# Patient Record
Sex: MALE | Race: BLACK OR AFRICAN AMERICAN | NOT HISPANIC OR LATINO | Employment: UNEMPLOYED | URBAN - METROPOLITAN AREA
[De-identification: names, ages, dates, MRNs, and addresses within clinical notes are randomized per-mention and may not be internally consistent; named-entity substitution may affect disease eponyms.]

---

## 2021-08-18 ENCOUNTER — OFFICE VISIT (OUTPATIENT)
Dept: INTERNAL MEDICINE | Facility: CLINIC | Age: 46
End: 2021-08-18

## 2021-08-18 VITALS
TEMPERATURE: 97.1 F | SYSTOLIC BLOOD PRESSURE: 144 MMHG | HEART RATE: 89 BPM | BODY MASS INDEX: 31.99 KG/M2 | HEIGHT: 69 IN | WEIGHT: 216 LBS | DIASTOLIC BLOOD PRESSURE: 90 MMHG | OXYGEN SATURATION: 99 %

## 2021-08-18 DIAGNOSIS — Z72.51 HIGH RISK HETEROSEXUAL BEHAVIOR: ICD-10-CM

## 2021-08-18 DIAGNOSIS — R30.0 DYSURIA: ICD-10-CM

## 2021-08-18 DIAGNOSIS — R03.0 SINGLE EPISODE OF ELEVATED BLOOD PRESSURE: ICD-10-CM

## 2021-08-18 DIAGNOSIS — J45.990 EXERCISE-INDUCED ASTHMA: ICD-10-CM

## 2021-08-18 DIAGNOSIS — Z13.220 SCREENING CHOLESTEROL LEVEL: Primary | ICD-10-CM

## 2021-08-18 DIAGNOSIS — Z13.1 SCREENING FOR DIABETES MELLITUS: ICD-10-CM

## 2021-08-18 DIAGNOSIS — Z00.00 ANNUAL PHYSICAL EXAM: ICD-10-CM

## 2021-08-18 DIAGNOSIS — E66.9 OBESITY (BMI 30-39.9): ICD-10-CM

## 2021-08-18 DIAGNOSIS — F17.200 NICOTINE USE DISORDER: ICD-10-CM

## 2021-08-18 DIAGNOSIS — Z12.11 SCREENING FOR COLON CANCER: ICD-10-CM

## 2021-08-18 DIAGNOSIS — F10.20 ALCOHOL USE DISORDER, SEVERE, DEPENDENCE (HCC): ICD-10-CM

## 2021-08-18 PROCEDURE — 99386 PREV VISIT NEW AGE 40-64: CPT | Performed by: STUDENT IN AN ORGANIZED HEALTH CARE EDUCATION/TRAINING PROGRAM

## 2021-08-18 PROCEDURE — 99204 OFFICE O/P NEW MOD 45 MIN: CPT | Performed by: STUDENT IN AN ORGANIZED HEALTH CARE EDUCATION/TRAINING PROGRAM

## 2021-08-18 RX ORDER — ALBUTEROL SULFATE 90 UG/1
2 AEROSOL, METERED RESPIRATORY (INHALATION) EVERY 4 HOURS PRN
Qty: 8 G | Refills: 5 | Status: SHIPPED | OUTPATIENT
Start: 2021-08-18

## 2021-08-18 RX ORDER — ASPIRIN 81 MG/1
81 TABLET ORAL DAILY
Qty: 90 TABLET | Refills: 1 | Status: SHIPPED | OUTPATIENT
Start: 2021-08-18

## 2021-08-18 RX ORDER — NICOTINE 21-14-7MG
KIT TRANSDERMAL
Qty: 1 EACH | Refills: 0 | Status: SHIPPED | OUTPATIENT
Start: 2021-08-18 | End: 2021-09-22

## 2021-08-18 NOTE — PROGRESS NOTES
"Chief Complaint  Annual checkup    HISTORY    Darrel Adams is a 46 y.o. male who presents to the office today as a  a new patient for their annual preventative exam. Their last preventative exam was at least 10 year or so ago. He is a transplant here from the [place] area and the exam was at a facility in that area.      Recently treated at the following:  Other: [facility]  right tib/fib fracture after changing a light bulb when the chair broke from underneath him. They set it in the ER there and they placed a cast. He saw an additional orthopedic specialist there and they informed him that a surgical procedure can be done if needed to assist him in his walking. They told him to stop smoking before they could do the surgery. They suggested he go to physical therapy but he has not gone to that yet, he plans to at some point soon.     Patient's overall comments about their health or any specific health concerns they report: he feels \"great\" but if he were to compare his current lifestyle of alcohol and tobacco to his previous life of being more active, playing in sports he would say that his health has declined.      No Known Allergies     No outpatient medications have been marked as taking for the 8/18/21 encounter (Office Visit) with Eleno Santizo DO.        Past Medical History:   Diagnosis Date   • Exercise-induced asthma    • Tibia/fibula fracture 2021    right side March 2021   History reviewed. No pertinent surgical history.  Family History   Problem Relation Age of Onset   • Alcohol abuse Mother    • Liver disease Mother    • No Known Problems Father         patient states he is not certain    • No Known Problems Sister         patient states he is not certain    • No Known Problems Brother         patient states he is not certain    • Cancer Neg Hx     reports that he has been smoking cigarettes. He has a 14.00 pack-year smoking history. He has quit using smokeless tobacco. He reports current alcohol use. He reports " "that he does not use drugs.      There is no immunization history on file for this patient.     OBJECTIVE    Vital Signs:   /90   Pulse 89   Temp 97.1 °F (36.2 °C) (Temporal)   Ht 175.3 cm (69\")   Wt 98 kg (216 lb)   SpO2 99%   BMI 31.90 kg/m²     Physical Exam  Vitals reviewed.   Constitutional:       General: He is not in acute distress.     Appearance: He is obese.   HENT:      Head: Normocephalic and atraumatic.      Right Ear: Tympanic membrane, ear canal and external ear normal. There is no impacted cerumen.      Left Ear: Tympanic membrane, ear canal and external ear normal. There is no impacted cerumen.      Mouth/Throat:      Mouth: Mucous membranes are moist.      Pharynx: No oropharyngeal exudate or posterior oropharyngeal erythema.   Eyes:      General: No scleral icterus.     Extraocular Movements: Extraocular movements intact.      Conjunctiva/sclera: Conjunctivae normal.      Pupils: Pupils are equal, round, and reactive to light.      Comments: Scleral melanocytosis present   Cardiovascular:      Rate and Rhythm: Normal rate and regular rhythm.      Heart sounds: Normal heart sounds. No murmur heard.     Pulmonary:      Effort: Pulmonary effort is normal. No respiratory distress.      Breath sounds: Normal breath sounds. No wheezing.   Abdominal:      General: Bowel sounds are normal. There is no distension.      Palpations: Abdomen is soft.      Tenderness: There is abdominal tenderness (Tenderness left upper quadrant without guarding).   Musculoskeletal:      Cervical back: Neck supple.      Right lower leg: No edema.      Left lower leg: No edema.   Lymphadenopathy:      Cervical: No cervical adenopathy.   Skin:     General: Skin is warm and dry.      Coloration: Skin is not jaundiced.   Neurological:      General: No focal deficit present.      Mental Status: He is alert and oriented to person, place, and time.      Cranial Nerves: No cranial nerve deficit.      Motor: No weakness.    "   Gait: Gait abnormal (Limping on right leg).   Psychiatric:         Mood and Affect: Mood normal.         Behavior: Behavior normal.         Thought Content: Thought content normal.            ASSESSMENT & PLAN     #Annual Preventative Health Examination  -Patient presents today for preventative health exam. The patient's last preventative health exam was over 10 years ago. Discussed with patient the importance of an annual preventative exam and encouraged continued yearly annual exams.   -Age and sex appropriate physical exam performed and documented. Updated past medical, family, social and surgical histories as well as allergies and care team list. Addressed care gaps listed in the medical record.  -Encouraged seat belt use for every car ride for patient and all occupants. Discussed securing of all guns in the home for patient and family protection. Encouraged sunscreen use to reduce risk of skin cancer for any days with sun exposure over 20 minutes. Recommended helmet if biking or riding motorcycle to prevent head trauma. Discussed the importance of smoke and carbon monoxide detectors in the home.   -Encouraged annual dental and vision exams as part of their overall health.  -Encouraged minimum of 30 minutes or more of exercise at a brisk walk or higher 5 days per week combined with a well-balanced diet. Exercise and dietary instructions will be provided in after visit summary.  -Immunizations reviewed and updated in EMR.  -Lipid screening:   Will screen today with lipid panel.   -Aspirin for primary or secondary prevention: Advised patient that aspirin 81 mg for prevention of coronary artery disease in healthy adults is controversial due to risk of GI bleeding, though some studies have shown that it could lead to decrease in colon cancer and heart attack. The risk for bleeding goes up as you get older and the risks likely outweigh the benefits in patients with a history of GI bleeding or peptic ulcer disease  or bleeding at other sites, age > 70 years, thrombocytopenia, coagulopathy, chronic kidney disease, or concurrent use of other medications that increase bleeding risks such as NSAIDs, steroids, anticoagulants or warfarin. Patient accepted aspirin therapy.  -Depression screening: PHQ2 performed and the patient's screen was negative. States that he has some feeling of being down from eviction but no depression.    -Diabetes screening:  Patient is 40-70 years of age and overweight, screening for diabetes is indicated every 3 years.  Will obtain hemoglobin A1c today.  -Tobacco use screening: Patient reports cigarette use. Tobacco counseling was declined. Advised patient that vaping is discouraged and data is beginning to support that it is associated with adverse health outcomes as well. If accepted, smoking cessation counseling will be documented below. He states that he is willing to try nicotine gum and patch, will prescribe.    -Alcohol use screening: Patient reports drinking a pint of alcohol daily.  I informed him that this could have consequences such as liver damage.  I told him I would not be surprised if his liver enzymes are elevated today.  Advised him to decrease drinking as much as possible.  He denied counseling.  Discussed that he qualifies for pneumonia vaccine given his alcohol use and he declined.  -Illicit drug screening: Patient does not use illicit drugs.   -Abdominal aortic aneurysm screening: AAA screening is not indicated as patient is less than 65 years of age.  -Hypertension screening: patient BP slightly elevated at 144/90, no known history. Have asked patient to keep a log at home with automated cuff and call office if noticing BP at home 130/80 or more.   -HIV screening: Discussed with patient the importance of identifying asymptomatic HIV infection for adults in their age group with a one time screening test .Patient accepted screening.   -Syphilis screening: Patient is at high risk for  sexually transmitted infections due to another risk factor and screening is indicated yearly. Patient accepted screening.  -Hepatitis B virus screening: Patient is in a high-risk group, screening indicated. Patient accepted screening.  -Hepatitis C virus screening:  Discussed with patient that the [employer]PSTF recommends a one-time screening of hepatitis C in all adults 18-79 years old. Patient accepted screening.  -Colon cancer screening: Patient is age 45-75 and I discussed the importance of colon cancer screening in order to detect cancerous or pre-cancerous lesions early in an effort to limit severity of cancer and possibly death if it is present. I informed the patient that screening for colon cancer has risks but these risks are small and include risks associated with a colonoscopy such as bleeding, pain or perforation, or the potential to need additional follow up tests or surgeries depending on the results. Discussed the options of colon cancer screening (i.e., colonoscopy vs FIT testing) and the benefits and disadvantages of each. Patient accepted screening. Will order cologuard per patient preference.   -Lung cancer screening: Patient is less than age 55, screening not indicated.  -Prostate cancer screening: Not applicable, patient is less than 55 years old.        Follow Up  Follow up in 1 year for annual physical exam.    Patient/family had no further questions at this time and verbalized understanding of the plan discussed today

## 2021-08-18 NOTE — PROGRESS NOTES
Chief Complaint  Painful urination alcohol use, smoking      SUBJECTIVE    HPI:  Darrel Adams is a 46 y.o. male who presents to the office today as a new patient to establish care.     Dysuria:  He has had some burning with going to the restroom, that has been going on for the last month to month and a half. It starts once his urine stream has started and it is at the tip of the penis. He notices it sometimes after he has been drinking heavily. No urethral discharge, no foul smelling urine.  No perineal pain or pelvic pain.  He was last sexually active around 6 months ago and that was with at least 3 different partners. He states that they have never shared that they had STIs. At one point he felt that his urine smelled sweet but no other odors. His urine is still yellow, no pink or red urine.  No fevers or chills.    Asthma: Patient states that he had exercise-induced asthma since childhood.  He does not currently have an albuterol inhaler.  He states that he feels wheezing about once per month.  Denies chest pain or shortness of air with activity or at rest.      Review of Systems   Constitutional: Negative for fever.   Cardiovascular: Positive for leg swelling (right leg after fracture). Negative for chest pain.   Genitourinary: Positive for dysuria.       No Known Allergies     No outpatient medications have been marked as taking for the 8/18/21 encounter (Office Visit) with Eleno Santizo DO.        Past Medical History:   Diagnosis Date   • Exercise-induced asthma    • Tibia/fibula fracture 2021    right side March 2021   History reviewed. No pertinent surgical history.  Family History   Problem Relation Age of Onset   • Alcohol abuse Mother    • Liver disease Mother    • No Known Problems Father         patient states he is not certain    • No Known Problems Sister         patient states he is not certain    • No Known Problems Brother         patient states he is not certain    • Cancer Neg Hx     reports that  "he has been smoking cigarettes. He has a 14.00 pack-year smoking history. He has quit using smokeless tobacco. He reports current alcohol use. He reports that he does not use drugs.    OBJECTIVE    Vital Signs:   /90   Pulse 89   Temp 97.1 °F (36.2 °C) (Temporal)   Ht 175.3 cm (69\")   Wt 98 kg (216 lb)   SpO2 99%   BMI 31.90 kg/m²     Physical Exam   Vitals reviewed.   Constitutional:       General: He is not in acute distress.     Appearance: He is obese.   HENT:      Head: Normocephalic and atraumatic.      Right Ear: Tympanic membrane, ear canal and external ear normal. There is no impacted cerumen.      Left Ear: Tympanic membrane, ear canal and external ear normal. There is no impacted cerumen.      Mouth/Throat:      Mouth: Mucous membranes are moist.      Pharynx: No oropharyngeal exudate or posterior oropharyngeal erythema.   Eyes:      General: No scleral icterus.     Extraocular Movements: Extraocular movements intact.      Conjunctiva/sclera: Conjunctivae normal.      Pupils: Pupils are equal, round, and reactive to light.      Comments: Scleral melanocytosis present   Cardiovascular:      Rate and Rhythm: Normal rate and regular rhythm.      Heart sounds: Normal heart sounds. No murmur heard.     Pulmonary:      Effort: Pulmonary effort is normal. No respiratory distress.      Breath sounds: Normal breath sounds. No wheezing.   Abdominal:      General: Bowel sounds are normal. There is no distension.      Palpations: Abdomen is soft.      Tenderness: There is abdominal tenderness (Tenderness left upper quadrant without guarding).   Musculoskeletal:      Cervical back: Neck supple.      Right lower leg: No edema.      Left lower leg: No edema.   Lymphadenopathy:      Cervical: No cervical adenopathy.   Skin:     General: Skin is warm and dry.      Coloration: Skin is not jaundiced.   Neurological:      General: No focal deficit present.      Mental Status: He is alert and oriented to person, " place, and time.      Cranial Nerves: No cranial nerve deficit.      Motor: No weakness.      Gait: Gait abnormal (Limping on right leg).   Psychiatric:         Mood and Affect: Mood normal.         Behavior: Behavior normal.         Thought Content: Thought content normal.                        ASSESSMENT & PLAN     Diagnoses and all orders for this visit:    1. Screening cholesterol level (Primary)  -     Lipid Panel With LDL/HDL Ratio    2. Dysuria  -No pelvic pain, no purulent discharge.  No significant changes in his urine odor or color.  6 months ago he did have 3 sexual encounters.  Will obtain urinalysis today as well as chlamydia, gonorrhea, trichomonas urine testing.  -     Urinalysis With Culture If Indicated - Urine, Clean Catch  -     Chlamydia trachomatis, Neisseria gonorrhoeae, Trichomonas vaginalis, PCR - Urine, Urine, Clean Catch    3. Alcohol use disorder, severe, dependence (CMS/HCC)  -Patient refused counseling today.  He is drinking a pint of liquor per day.  I informed him that this is a high risk for liver damage.  We will screen for that today with CMP.  Will screen for anemia with CBC.  Unemployment, Covid, issues with housing is a serious concern for him.  We will continue to provide support and recommend cessation.  -     Comprehensive metabolic panel  -     CBC w AUTO Differential    4. Single episode of elevated blood pressure   -BP elevated at 144/90 today.  Patient does have a blood pressure cuff at home.  He has no known history of hypertension.  I have asked patient to keep a blood pressure log, check blood pressure randomly several times per week and bring the log to his next appointment.  Call office earlier if he is noticing blood pressure readings at home of 130/80.    5. High risk heterosexual behavior  -Last high risk sexual behavior risk was approximately 6 months ago with 3 partners.  Has had some dysuria so will screen.  Advised on safe sex practices  -     Hepatitis panel,  acute  -     RPR  -     HIV-1/O/2 Ag/Ab w Reflex  -     Chlamydia trachomatis, Neisseria gonorrhoeae, Trichomonas vaginalis, PCR - Urine, Urine, Clean Catch    6. Obesity (BMI 30-39.9)  -Encouraged 30 minutes/day of exercise 5 days a week at a brisk walk or more.  Will obtain thyroid screening.  -     TSH Rfx On Abnormal To Free T4    7. Nicotine use disorder  -Patient refused formal smoking cessation counseling today.  He was willing to accept nicotine patches and gum.  Provided education on how to use those.  -     Nicotine 21-14-7 MG/24HR kit; Use one 21 mg patch per day Weeks 1-4, then use one 14 mg patch per day Weeks 5-6, then Use one 7 mg patch/day Weeks 7-8.  Dispense: 1 each; Refill: 0  -     nicotine polacrilex (NICORETTE) 2 MG gum; Chew 1 each As Needed for Smoking Cessation. Use every 3 hours for cravings. Use one piece of gum at a time.  Dispense: 220 each; Refill: 1    8. Exercise-induced asthma  -Symptoms seem rare at once per month wheezing.  Will obtain formal PFTs to assess severity.  Will provide albuterol inhaler as needed.  -     albuterol sulfate  (90 Base) MCG/ACT inhaler; Inhale 2 puffs Every 4 (Four) Hours As Needed for Wheezing.  Dispense: 8 g; Refill: 5  -     Full Pulmonary Function Test With Bronchodilator; Future    9. Screening for diabetes mellitus  -     Hemoglobin A1c    10. Screening for colon cancer  -     Cologuard - Stool, Per Rectum; Future      Other orders  -     aspirin (aspirin) 81 MG EC tablet; Take 1 tablet by mouth Daily.  Dispense: 90 tablet; Refill: 1            The following social determinates of health impact the patient's medical decision making: Z56 - Problems related to employment and unemployment and Z59 - Problems related to housing and economic circumstances    Follow Up  Return in about 4 weeks (around 9/15/2021).    Patient/family had no further questions at this time and verbalized understanding of the plan discussed today. I asked the patient to  contact the office via MyChart or telephone if they have any concerns and that I expect to respond to routine calls and YChartshart messages within 1-2 business days and send them follow up on routine lab tests within one week.     Informed patient to visit their nearest emergency department if they experience symptoms such as chest pain, new onset of shortness of air or shortness of air that is worse than their baseline, severe abdominal pain or blood in the stool, inability to eat or drink, thoughts of hurting themselves or others, or any other concerns they feel need emergency medical attention.

## 2021-08-20 LAB
ALBUMIN SERPL-MCNC: 4.8 G/DL (ref 3.5–5.2)
ALBUMIN/GLOB SERPL: 1.7 G/DL
ALP SERPL-CCNC: 106 U/L (ref 39–117)
ALT SERPL-CCNC: 40 U/L (ref 1–41)
APPEARANCE UR: CLEAR
AST SERPL-CCNC: 22 U/L (ref 1–40)
BACTERIA #/AREA URNS HPF: NORMAL /HPF
BASOPHILS # BLD AUTO: 0.03 10*3/MM3 (ref 0–0.2)
BASOPHILS NFR BLD AUTO: 0.3 % (ref 0–1.5)
BILIRUB SERPL-MCNC: 0.3 MG/DL (ref 0–1.2)
BILIRUB UR QL STRIP: NEGATIVE
BUN SERPL-MCNC: 16 MG/DL (ref 6–20)
BUN/CREAT SERPL: 13.4 (ref 7–25)
C TRACH RRNA SPEC QL NAA+PROBE: NEGATIVE
CALCIUM SERPL-MCNC: 10 MG/DL (ref 8.6–10.5)
CASTS URNS QL MICRO: NORMAL /LPF
CHLORIDE SERPL-SCNC: 102 MMOL/L (ref 98–107)
CHOLEST SERPL-MCNC: 273 MG/DL (ref 0–200)
CO2 SERPL-SCNC: 26.1 MMOL/L (ref 22–29)
COLOR UR: YELLOW
CREAT SERPL-MCNC: 1.19 MG/DL (ref 0.76–1.27)
EOSINOPHIL # BLD AUTO: 0.1 10*3/MM3 (ref 0–0.4)
EOSINOPHIL NFR BLD AUTO: 1.1 % (ref 0.3–6.2)
EPI CELLS #/AREA URNS HPF: NORMAL /HPF (ref 0–10)
ERYTHROCYTE [DISTWIDTH] IN BLOOD BY AUTOMATED COUNT: 14.3 % (ref 12.3–15.4)
GLOBULIN SER CALC-MCNC: 2.8 GM/DL
GLUCOSE SERPL-MCNC: 119 MG/DL (ref 65–99)
GLUCOSE UR QL: NEGATIVE
HAV IGM SERPL QL IA: NEGATIVE
HBA1C MFR BLD: 6 % (ref 4.8–5.6)
HBV CORE IGM SERPL QL IA: NEGATIVE
HBV SURFACE AG SERPL QL IA: NEGATIVE
HCT VFR BLD AUTO: 48.9 % (ref 37.5–51)
HCV AB S/CO SERPL IA: 0.2 S/CO RATIO (ref 0–0.9)
HDLC SERPL-MCNC: 48 MG/DL (ref 40–60)
HGB BLD-MCNC: 16.7 G/DL (ref 13–17.7)
HGB UR QL STRIP: NEGATIVE
HIV 1+2 AB+HIV1 P24 AG SERPL QL IA: NON REACTIVE
IMM GRANULOCYTES # BLD AUTO: 0.08 10*3/MM3 (ref 0–0.05)
IMM GRANULOCYTES NFR BLD AUTO: 0.9 % (ref 0–0.5)
KETONES UR QL STRIP: NEGATIVE
LDLC SERPL CALC-MCNC: 155 MG/DL (ref 0–100)
LDLC/HDLC SERPL: 3.13 {RATIO}
LEUKOCYTE ESTERASE UR QL STRIP: NEGATIVE
LYMPHOCYTES # BLD AUTO: 2.73 10*3/MM3 (ref 0.7–3.1)
LYMPHOCYTES NFR BLD AUTO: 29.2 % (ref 19.6–45.3)
MCH RBC QN AUTO: 28.5 PG (ref 26.6–33)
MCHC RBC AUTO-ENTMCNC: 34.2 G/DL (ref 31.5–35.7)
MCV RBC AUTO: 83.6 FL (ref 79–97)
MICRO URNS: NORMAL
MICRO URNS: NORMAL
MONOCYTES # BLD AUTO: 0.58 10*3/MM3 (ref 0.1–0.9)
MONOCYTES NFR BLD AUTO: 6.2 % (ref 5–12)
N GONORRHOEA RRNA SPEC QL NAA+PROBE: NEGATIVE
NEUTROPHILS # BLD AUTO: 5.82 10*3/MM3 (ref 1.7–7)
NEUTROPHILS NFR BLD AUTO: 62.3 % (ref 42.7–76)
NITRITE UR QL STRIP: NEGATIVE
NRBC BLD AUTO-RTO: 0 /100 WBC (ref 0–0.2)
PH UR STRIP: 5 [PH] (ref 5–7.5)
PLATELET # BLD AUTO: 199 10*3/MM3 (ref 140–450)
POTASSIUM SERPL-SCNC: 4.2 MMOL/L (ref 3.5–5.2)
PROT SERPL-MCNC: 7.6 G/DL (ref 6–8.5)
PROT UR QL STRIP: NORMAL
RBC # BLD AUTO: 5.85 10*6/MM3 (ref 4.14–5.8)
RBC #/AREA URNS HPF: NORMAL /HPF (ref 0–2)
RPR SER QL: NORMAL
SODIUM SERPL-SCNC: 139 MMOL/L (ref 136–145)
SP GR UR: 1.03 (ref 1–1.03)
T VAGINALIS DNA SPEC QL NAA+PROBE: NEGATIVE
TRIGL SERPL-MCNC: 374 MG/DL (ref 0–150)
TSH SERPL DL<=0.005 MIU/L-ACNC: 1.18 UIU/ML (ref 0.27–4.2)
URINALYSIS REFLEX: NORMAL
UROBILINOGEN UR STRIP-MCNC: 0.2 MG/DL (ref 0.2–1)
VLDLC SERPL CALC-MCNC: 70 MG/DL (ref 5–40)
WBC # BLD AUTO: 9.34 10*3/MM3 (ref 3.4–10.8)
WBC #/AREA URNS HPF: NORMAL /HPF (ref 0–5)

## 2021-08-20 NOTE — PROGRESS NOTES
Please call patient and let him know that I have reviewed the results of his laboratory test.  His cholesterol and triglycerides are elevated and his blood work shows that he has prediabetes and I recommend that he begins exercising at least 30 minutes/day at a brisk walk or more for 5 days a week and cut back on fatty-processed-or fast foods.  His kidneys, electrolytes, liver function are within normal limits.  His hepatitis screening is negative as is his HIV, chlamydia, gonorrhea, and syphilis screening.  His thyroid function is within normal limits.  It is important that he keeps his next appointment so that we can discuss the cholesterol and prediabetes.

## 2021-08-23 ENCOUNTER — TELEPHONE (OUTPATIENT)
Dept: INTERNAL MEDICINE | Facility: CLINIC | Age: 46
End: 2021-08-23

## 2021-08-23 NOTE — TELEPHONE ENCOUNTER
PATIENT IS CALLING IN STATING THAT HE DOES NOT HAVE THE PRESCRIPTION FOR THE AT HOME COLON CANCER TEST AND HE NEEDS THIS IN ORDER TO PICK THE TEST UP FROM HIS PHARMACY.  PATIENT CALL BACK

## 2021-08-23 NOTE — TELEPHONE ENCOUNTER
Patient is aware kit will be mailed to him for the colon cancer screening.. Patient verbalized understanding.

## 2021-08-25 ENCOUNTER — TRANSCRIBE ORDERS (OUTPATIENT)
Dept: ADMINISTRATIVE | Facility: HOSPITAL | Age: 46
End: 2021-08-25

## 2021-08-25 DIAGNOSIS — Z01.818 OTHER SPECIFIED PRE-OPERATIVE EXAMINATION: Primary | ICD-10-CM

## 2021-09-08 ENCOUNTER — TELEPHONE (OUTPATIENT)
Dept: INTERNAL MEDICINE | Facility: CLINIC | Age: 46
End: 2021-09-08

## 2021-09-08 NOTE — TELEPHONE ENCOUNTER
PATIENT CALLED STATING THAT THE COLON TEST DID NOT ARRIVE YET AND HE IS WANTING TO GET THIS DONE BEFORE HIS NEXT APPT.     CAN WE GET AN UPDATE ON THIS OR RESEND THE TEST?         HUB ALSO ADVISED TO HAVE PCP LISTED ON CARD FOR HIS VISIT     Darrel Adams (Self) [phone] (M)

## 2021-09-08 NOTE — TELEPHONE ENCOUNTER
Cologard kit is going out today. Patient should received it in about 7-10 days. Patient has been notified.

## 2021-09-22 ENCOUNTER — OFFICE VISIT (OUTPATIENT)
Dept: INTERNAL MEDICINE | Facility: CLINIC | Age: 46
End: 2021-09-22

## 2021-09-22 VITALS
DIASTOLIC BLOOD PRESSURE: 98 MMHG | TEMPERATURE: 96.9 F | WEIGHT: 218 LBS | SYSTOLIC BLOOD PRESSURE: 180 MMHG | OXYGEN SATURATION: 98 % | HEIGHT: 69 IN | HEART RATE: 95 BPM | BODY MASS INDEX: 32.29 KG/M2

## 2021-09-22 DIAGNOSIS — I10 ESSENTIAL HYPERTENSION: ICD-10-CM

## 2021-09-22 DIAGNOSIS — F17.200 NICOTINE USE DISORDER: ICD-10-CM

## 2021-09-22 DIAGNOSIS — Z23 ENCOUNTER FOR IMMUNIZATION: ICD-10-CM

## 2021-09-22 DIAGNOSIS — E78.2 MIXED HYPERLIPIDEMIA: ICD-10-CM

## 2021-09-22 DIAGNOSIS — F10.29 ALCOHOL DEPENDENCE WITH UNSPECIFIED ALCOHOL-INDUCED DISORDER (HCC): ICD-10-CM

## 2021-09-22 DIAGNOSIS — R73.03 PREDIABETES: Primary | ICD-10-CM

## 2021-09-22 PROCEDURE — 90732 PPSV23 VACC 2 YRS+ SUBQ/IM: CPT | Performed by: STUDENT IN AN ORGANIZED HEALTH CARE EDUCATION/TRAINING PROGRAM

## 2021-09-22 PROCEDURE — 99417 PROLNG OP E/M EACH 15 MIN: CPT | Performed by: STUDENT IN AN ORGANIZED HEALTH CARE EDUCATION/TRAINING PROGRAM

## 2021-09-22 PROCEDURE — 90471 IMMUNIZATION ADMIN: CPT | Performed by: STUDENT IN AN ORGANIZED HEALTH CARE EDUCATION/TRAINING PROGRAM

## 2021-09-22 PROCEDURE — 99215 OFFICE O/P EST HI 40 MIN: CPT | Performed by: STUDENT IN AN ORGANIZED HEALTH CARE EDUCATION/TRAINING PROGRAM

## 2021-09-22 RX ORDER — BUPROPION HYDROCHLORIDE 150 MG/1
TABLET, EXTENDED RELEASE ORAL
Qty: 57 TABLET | Refills: 0 | Status: SHIPPED | OUTPATIENT
Start: 2021-09-22 | End: 2021-09-22

## 2021-09-22 RX ORDER — ATORVASTATIN CALCIUM 20 MG/1
20 TABLET, FILM COATED ORAL DAILY
Qty: 90 TABLET | Refills: 1 | Status: SHIPPED | OUTPATIENT
Start: 2021-09-22

## 2021-09-22 RX ORDER — VARENICLINE TARTRATE 1 MG/1
1 TABLET, FILM COATED ORAL 2 TIMES DAILY
Qty: 60 TABLET | Refills: 1 | Status: SHIPPED | OUTPATIENT
Start: 2021-10-22

## 2021-09-22 RX ORDER — VARENICLINE TARTRATE 1 MG/1
1 TABLET, FILM COATED ORAL 2 TIMES DAILY
Qty: 60 TABLET | Refills: 1 | Status: SHIPPED | OUTPATIENT
Start: 2021-10-17 | End: 2021-09-22

## 2021-09-22 RX ORDER — VARENICLINE TARTRATE 0.5 MG/1
TABLET, FILM COATED ORAL
Qty: 53 TABLET | Refills: 0 | Status: SHIPPED | OUTPATIENT
Start: 2021-09-22

## 2021-09-22 RX ORDER — AMLODIPINE BESYLATE 5 MG/1
5 TABLET ORAL DAILY
Qty: 90 TABLET | Refills: 0 | Status: SHIPPED | OUTPATIENT
Start: 2021-09-22

## 2021-09-22 NOTE — PROGRESS NOTES
"  Eleno Santizo D.O.  Internal Medicine  [facility]  [address]  [place] [address]  [phone]      Chief Complaint  4 weeks follow-up on cholesterol (patient would like to get the pneumonia vaccine today)    SUBJECTIVE    History of Present Illness    Darrel Adams is a 46 y.o. male who presents to the office today as {new/est:75395}       Review of Systems    No Known Allergies     Outpatient Medications Marked as Taking for the 9/22/21 encounter (Office Visit) with Eleno Santizo, DO   Medication Sig Dispense Refill   • albuterol sulfate  (90 Base) MCG/ACT inhaler Inhale 2 puffs Every 4 (Four) Hours As Needed for Wheezing. 8 g 5   • aspirin (aspirin) 81 MG EC tablet Take 1 tablet by mouth Daily. 90 tablet 1   • [DISCONTINUED] Nicotine 21-14-7 MG/24HR kit Use one 21 mg patch per day Weeks 1-4, then use one 14 mg patch per day Weeks 5-6, then Use one 7 mg patch/day Weeks 7-8. 1 each 0   • [DISCONTINUED] nicotine polacrilex (NICORETTE) 2 MG gum Chew 1 each As Needed for Smoking Cessation. Use every 3 hours for cravings. Use one piece of gum at a time. 220 each 1        {new/est histories:22304}    OBJECTIVE    Vital Signs:   /98   Pulse 95   Temp 96.9 °F (36.1 °C)   Ht 175.3 cm (69\")   Wt 98.9 kg (218 lb)   SpO2 98%   BMI 32.19 kg/m²     Physical Exam       {The following data was reviewed by (Optional):52353}  {Ambulatory Labs (Optional):80950}  {Data reviewed (Optional):48990:::1}             ASSESSMENT & PLAN     Diagnoses and all orders for this visit:    1. Prediabetes (Primary)    2. Nicotine use disorder  -     buPROPion SR (WELLBUTRIN SR) 150 MG 12 hr tablet; Take 1 tablet by mouth Daily for 3 days, THEN 1 tablet 2 (Two) Times a Day for 27 days.  Dispense: 57 tablet; Refill: 0    3. Essential hypertension  -     amLODIPine (NORVASC) 5 MG tablet; Take 1 tablet by mouth Daily.  Dispense: 90 tablet; Refill: 0    4. Mixed hyperlipidemia  -     atorvastatin " "(LIPITOR) 20 MG tablet; Take 1 tablet by mouth Daily.  Dispense: 90 tablet; Refill: 1        {Time Spent (Optional):06735}    The following social determinates of health impact the patient's medical decision making: {[facility]:08683::\"No social determinates of health were factored in to today's visit. \"}    Follow Up  Return in about 4 weeks (around 10/20/2021) for Recheck.    Patient/family had no further questions at this time and verbalized understanding of the plan discussed today.   "

## 2021-09-22 NOTE — PROGRESS NOTES
"  Eleno Santizo D.O.  Internal Medicine  [facility]  [address]  [place] [address]  [phone]      Chief Complaint  4 weeks follow-up on cholesterol (patient would like to get the pneumonia vaccine today)    SUBJECTIVE    History of Present Illness    Darrel Adams is a 46 y.o. male who presents to the office today as an established patient that last saw me on 8/18/2021.     Still drinking, pt states up to a pint per day. He does not have a strong desire to quit drinking. He states his primary motivation to keep drinking is his employment situation and housing situation.     Still smoking as well, states he is smoking 1 pack per day. He tried the nicotine patches that I provided him last visit and he states that he smoked right through them.     BP: Not checking BP at home, had an elevated reading of 144/90 at his last visit.         No Known Allergies     Outpatient Medications Marked as Taking for the 9/22/21 encounter (Office Visit) with Eleno Santizo, DO   Medication Sig Dispense Refill   • albuterol sulfate  (90 Base) MCG/ACT inhaler Inhale 2 puffs Every 4 (Four) Hours As Needed for Wheezing. 8 g 5   • aspirin (aspirin) 81 MG EC tablet Take 1 tablet by mouth Daily. 90 tablet 1   • [DISCONTINUED] Nicotine 21-14-7 MG/24HR kit Use one 21 mg patch per day Weeks 1-4, then use one 14 mg patch per day Weeks 5-6, then Use one 7 mg patch/day Weeks 7-8. 1 each 0   • [DISCONTINUED] nicotine polacrilex (NICORETTE) 2 MG gum Chew 1 each As Needed for Smoking Cessation. Use every 3 hours for cravings. Use one piece of gum at a time. 220 each 1        Past Medical History:   Diagnosis Date   • Exercise-induced asthma    • Tibia/fibula fracture 2021    right side March 2021       OBJECTIVE    Vital Signs:   /98   Pulse 95   Temp 96.9 °F (36.1 °C)   Ht 175.3 cm (69\")   Wt 98.9 kg (218 lb)   SpO2 98%   BMI 32.19 kg/m²     Physical Exam  Vitals reviewed.   Constitutional:       " General: He is not in acute distress.     Appearance: Normal appearance.   Eyes:      General: No scleral icterus.  Cardiovascular:      Rate and Rhythm: Normal rate and regular rhythm.      Heart sounds: Normal heart sounds. No murmur heard.     Pulmonary:      Effort: Pulmonary effort is normal. No respiratory distress.      Breath sounds: Normal breath sounds. No wheezing.   Musculoskeletal:      Right lower leg: No edema.      Left lower leg: No edema.   Neurological:      Mental Status: He is alert and oriented to person, place, and time.   Psychiatric:         Mood and Affect: Mood normal.         Behavior: Behavior normal.            The following data was reviewed by: Eleno Santizo DO on 09/22/2021:  Common labs    Common Labsle 8/18/21 8/18/21 8/18/21 8/18/21    1050 1050 1050 1050   Glucose   119 (A)    BUN   16    Creatinine   1.19    eGFR Non  Am   66    eGFR African Am   80    Sodium   139    Potassium   4.2    Chloride   102    Calcium   10.0    Total Protein   7.6    Albumin   4.80    Total Bilirubin   0.3    Alkaline Phosphatase   106    AST (SGOT)   22    ALT (SGPT)   40    WBC    9.34   Hemoglobin    16.7   Hematocrit    48.9   Platelets    199   Total Cholesterol 273 (A)      Triglycerides 374 (A)      HDL Cholesterol 48      LDL Cholesterol  155 (A)      Hemoglobin A1C  6.00 (A)     (A) Abnormal value       Comments are available for some flowsheets but are not being displayed.                        ASSESSMENT & PLAN     Diagnoses and all orders for this visit:    1. Prediabetes (Primary)  -A1c was 6 in August 2021, BMI 32. Encouraged aggressive weight loss as well as increase in exercise and decrease in sugary foods. Consider referral to nutrition if he is not able to have success on his own, can also consider metformin at that time given his high risk.     2. Nicotine use disorder  -Currently smoking 1 pack per day, failed nicotine replacement therapy prescribed last month. He does  express a desire to quit smoking still, so I will Rx a trial of varenicline. He does not have a history of seizures but he does have ETOH use and bupropion is contraindicated. Encouraged him to set a quit date and discard all smoking related items from his home and car. Will assess how he is doing at next visit. Discussed seriousness of smoking while having prediabetes and elevated BP and cholesterol.     3. Essential hypertension  -BP elevated in office at 180/98 and on repeat 134/60, he is not checking it at home, he is financially limited. Last visit his BP was 144/90. Discussed that alcohol use can impact his BP as well. Will start with amlodipine 5 mg daily , assess for improvement at next visit.   -     amLODIPine (NORVASC) 5 MG tablet; Take 1 tablet by mouth Daily.  Dispense: 90 tablet; Refill: 0    4. Mixed hyperlipidemia  -Tchol 273, Trig 374,  and HDL 48. ASCVD risk calculated to be 14% over 10 years. Discussed with patient the importance of controlling his risk factors for coronary artery disease and stroke, he verbalized understanding. Discussed common side effects of atorvastatin and he was agreeable to start. Will Rx atorvastatin 20 mg daily.   -     atorvastatin (LIPITOR) 20 MG tablet; Take 1 tablet by mouth Daily.  Dispense: 90 tablet; Refill: 1    5. Alcohol dependence with unspecified alcohol-induced disorder (HCC)  -Discussed with patient long term implications of alcohol use including liver function and cardiac. He has minimal interest in quitting etoh use and I encouraged him and provided contact information for [facility] here in [facility], as they do inpatient and outpatient recovery as well as medication based therapy. He states that he will contact them. Recommended that he consider AA as well.       I spent 85 minutes caring for Darrel on this date of service. This time includes time spent by me in the following activities:preparing for the visit, reviewing tests,  performing a medically appropriate examination and/or evaluation , counseling and educating the patient/family/caregiver, ordering medications, tests, or procedures and documenting information in the medical record     The following social determinates of health impact the patient's medical decision making: Z56 - Problems related to employment and unemployment    Follow Up  Return in about 4 weeks (around 10/20/2021) for Recheck.    Patient/family had no further questions at this time and verbalized understanding of the plan discussed today.

## 2021-09-22 NOTE — PATIENT INSTRUCTIONS
Varenicline oral tablets  What is this medicine?  VARENICLINE (osman e NI kleen) is used to help people quit smoking. It is used with a patient support program recommended by your physician.  This medicine may be used for other purposes; ask your health care provider or pharmacist if you have questions.  COMMON BRAND NAME(S): Maicol  What should I tell my health care provider before I take this medicine?  They need to know if you have any of these conditions:  · heart disease  · if you often drink alcohol  · kidney disease  · mental illness  · on hemodialysis  · seizures  · history of stroke  · suicidal thoughts, plans, or attempt; a previous suicide attempt by you or a family member  · an unusual or allergic reaction to varenicline, other medicines, foods, dyes, or preservatives  · pregnant or trying to get pregnant  · breast-feeding  How should I use this medicine?  Take this medicine by mouth after eating. Take with a full glass of water. Follow the directions on the prescription label. Take your doses at regular intervals. Do not take your medicine more often than directed.  There are 3 ways you can use this medicine to help you quit smoking; talk to your health care professional to decide which plan is right for you:  1) you can choose a quit date and start this medicine 1 week before the quit date, or,  2) you can start taking this medicine before you choose a quit date, and then pick a quit date between day 8 and 35 days of treatment, or,  3) if you are not sure that you are able or willing to quit smoking right away, start taking this medicine and slowly decrease the amount you smoke as directed by your health care professional with the goal of being cigarette-free by week 12 of treatment.  Stick to your plan; ask about support groups or other ways to help you remain cigarette-free. If you are motivated to quit smoking and did not succeed during a previous attempt with this medicine for reasons other than  side effects, or if you returned to smoking after this treatment, speak with your health care professional about whether another course of this medicine may be right for you.  A special MedGuide will be given to you by the pharmacist with each prescription and refill. Be sure to read this information carefully each time.  Talk to your pediatrician regarding the use of this medicine in children. This medicine is not approved for use in children.  Overdosage: If you think you have taken too much of this medicine contact a poison control center or emergency room at once.  NOTE: This medicine is only for you. Do not share this medicine with others.  What if I miss a dose?  If you miss a dose, take it as soon as you can. If it is almost time for your next dose, take only that dose. Do not take double or extra doses.  What may interact with this medicine?  · alcohol  · insulin  · other medicines used to help people quit smoking  · theophylline  · warfarin  This list may not describe all possible interactions. Give your health care provider a list of all the medicines, herbs, non-prescription drugs, or dietary supplements you use. Also tell them if you smoke, drink alcohol, or use illegal drugs. Some items may interact with your medicine.  What should I watch for while using this medicine?  It is okay if you do not succeed at your attempt to quit and have a cigarette. You can still continue your quit attempt and keep using this medicine as directed. Just throw away your cigarettes and get back to your quit plan.  Talk to your health care provider before using other treatments to quit smoking. Using this medicine with other treatments to quit smoking may increase the risk for side effects compared to using a treatment alone.  You may get drowsy or dizzy. Do not drive, use machinery, or do anything that needs mental alertness until you know how this medicine affects you. Do not stand or sit up quickly, especially if you are  "an older patient. This reduces the risk of dizzy or fainting spells.  Decrease the number of alcoholic beverages that you drink during treatment with this medicine until you know if this medicine affects your ability to tolerate alcohol. Some people have experienced increased drunkenness (intoxication), unusual or sometimes aggressive behavior, or no memory of things that have happened (amnesia) during treatment with this medicine.  Sleepwalking can happen during treatment with this medicine, and can sometimes lead to behavior that is harmful to you, other people, or property. Stop taking this medicine and tell your doctor if you start sleepwalking or have other unusual sleep-related activity.  After taking this medicine, you may get up out of bed and do an activity that you do not know you are doing. The next morning, you may have no memory of this. Activities include driving a car (\"sleep-driving\"), making and eating food, talking on the phone, sexual activity, and sleep-walking. Serious injuries have occurred. Stop the medicine and call your doctor right away if you find out you have done any of these activities. Do not take this medicine if you have used alcohol that evening. Do not take it if you have taken another medicine for sleep. The risk of doing these sleep-related activities is higher.  Patients and their families should watch out for new or worsening depression or thoughts of suicide. Also watch out for sudden changes in feelings such as feeling anxious, agitated, panicky, irritable, hostile, aggressive, impulsive, severely restless, overly excited and hyperactive, or not being able to sleep. If this happens, call your health care professional.  If you have diabetes and you quit smoking, the effects of insulin may be increased and you may need to reduce your insulin dose. Check with your doctor or health care professional about how you should adjust your insulin dose.  What side effects may I notice " from receiving this medicine?  Side effects that you should report to your doctor or health care professional as soon as possible:  · allergic reactions like skin rash, itching or hives, swelling of the face, lips, tongue, or throat  · acting aggressive, being angry or violent, or acting on dangerous impulses  · breathing problems  · changes in emotions or moods  · chest pain or chest tightness  · feeling faint or lightheaded, falls  · hallucination, loss of contact with reality  · mouth sores  · redness, blistering, peeling or loosening of the skin, including inside the mouth  · signs and symptoms of a stroke like changes in vision; confusion; trouble speaking or understanding; severe headaches; sudden numbness or weakness of the face, arm or leg; trouble walking; dizziness; loss of balance or coordination  · seizures  · sleepwalking  · suicidal thoughts or other mood changes  Side effects that usually do not require medical attention (report to your doctor or health care professional if they continue or are bothersome):  · constipation  · gas  · headache  · nausea, vomiting  · strange dreams  · trouble sleeping  This list may not describe all possible side effects. Call your doctor for medical advice about side effects. You may report side effects to FDA at 4-432-FDA-9323.  Where should I keep my medicine?  Keep out of the reach of children.  Store at room temperature between 15 and 30 degrees C (59 and 86 degrees F). Throw away any unused medicine after the expiration date.  NOTE: This sheet is a summary. It may not cover all possible information. If you have questions about this medicine, talk to your doctor, pharmacist, or health care provider.  © 2021 [employer] Standard (2019-12-06 14:27:36)    Atorvastatin tablets  What is this medicine?  ATORVASTATIN (a TORE va sta tin) is known as a HMG-CoA reductase inhibitor or 'statin'. It lowers the level of cholesterol and triglycerides in the blood. This drug may  also reduce the risk of heart attack, stroke, or other health problems in patients with risk factors for heart disease. Diet and lifestyle changes are often used with this drug.  This medicine may be used for other purposes; ask your health care provider or pharmacist if you have questions.  COMMON BRAND NAME(S): Lipitor  What should I tell my health care provider before I take this medicine?  They need to know if you have any of these conditions:  · diabetes  · if you often drink alcohol  · history of stroke  · kidney disease  · liver disease  · muscle aches or weakness  · thyroid disease  · an unusual or allergic reaction to atorvastatin, other medicines, foods, dyes, or preservatives  · pregnant or trying to get pregnant  · breast-feeding  How should I use this medicine?  Take this medicine by mouth with a glass of water. Follow the directions on the prescription label. You can take it with or without food. If it upsets your stomach, take it with food. Do not take with grapefruit juice. Take your medicine at regular intervals. Do not take it more often than directed. Do not stop taking except on your doctor's advice.  Talk to your pediatrician regarding the use of this medicine in children. While this drug may be prescribed for children as young as 10 for selected conditions, precautions do apply.  Overdosage: If you think you have taken too much of this medicine contact a poison control center or emergency room at once.  NOTE: This medicine is only for you. Do not share this medicine with others.  What if I miss a dose?  If you miss a dose, take it as soon as you can. If your next dose is to be taken in less than 12 hours, then do not take the missed dose. Take the next dose at your regular time. Do not take double or extra doses.  What may interact with this medicine?  Do not take this medicine with any of the following medications:  · dasabuvir; ombitasvir; paritaprevir; ritonavir  · ombitasvir; paritaprevir;  ritonavir  · posaconazole  · red yeast rice  This medicine may also interact with the following medications:  · alcohol  · birth control pills  · certain antibiotics like erythromycin and clarithromycin  · certain antivirals for HIV or hepatitis  · certain medicines for cholesterol like fenofibrate, gemfibrozil, and niacin  · certain medicines for fungal infections like ketoconazole and itraconazole  · colchicine  · cyclosporine  · digoxin  · grapefruit juice  · rifampin  This list may not describe all possible interactions. Give your health care provider a list of all the medicines, herbs, non-prescription drugs, or dietary supplements you use. Also tell them if you smoke, drink alcohol, or use illegal drugs. Some items may interact with your medicine.  What should I watch for while using this medicine?  Visit your doctor or health care professional for regular check-ups. You may need regular tests to make sure your liver is working properly.  Your health care professional may tell you to stop taking this medicine if you develop muscle problems. If your muscle problems do not go away after stopping this medicine, contact your health care professional.  Do not become pregnant while taking this medicine. Women should inform their health care professional if they wish to become pregnant or think they might be pregnant. There is a potential for serious side effects to an unborn child. Talk to your health care professional or pharmacist for more information. Do not breast-feed an infant while taking this medicine.  This medicine may increase blood sugar. Ask your healthcare provider if changes in diet or medicines are needed if you have diabetes.  If you are going to need surgery or other procedure, tell your doctor that you are using this medicine.  This drug is only part of a total heart-health program. Your doctor or a dietician can suggest a low-cholesterol and low-fat diet to help. Avoid alcohol and smoking, and  keep a proper exercise schedule.  This medicine may cause a decrease in Co-Enzyme Q-10. You should make sure that you get enough Co-Enzyme Q-10 while you are taking this medicine. Discuss the foods you eat and the vitamins you take with your health care professional.  What side effects may I notice from receiving this medicine?  Side effects that you should report to your doctor or health care professional as soon as possible:  · allergic reactions like skin rash, itching or hives, swelling of the face, lips, or tongue  · fever  · joint pain  · loss of memory  · redness, blistering, peeling or loosening of the skin, including inside the mouth  · signs and symptoms of high blood sugar such as being more thirsty or hungry or having to urinate more than normal. You may also feel very tired or have blurry vision.  · signs and symptoms of liver injury like dark yellow or brown urine; general ill feeling or flu-like symptoms; light-belly pain; unusually weak or tired; yellowing of the eyes or skin  · signs and symptoms of muscle injury like dark urine; trouble passing urine or change in the amount of urine; unusually weak or tired; muscle pain or side or back pain  Side effects that usually do not require medical attention (report to your doctor or health care professional if they continue or are bothersome):  · diarrhea  · nausea  · stomach pain  · trouble sleeping  · upset stomach  This list may not describe all possible side effects. Call your doctor for medical advice about side effects. You may report side effects to FDA at 1-315-FDA-0981.  Where should I keep my medicine?  Keep out of the reach of children.  Store between 20 and 25 degrees C (68 and 77 degrees F). Throw away any unused medicine after the expiration date.  NOTE: This sheet is a summary. It may not cover all possible information. If you have questions about this medicine, talk to your doctor, pharmacist, or health care provider.  © 2021 [employer]  "Standard (2019-10-09 11:36:16)  High Cholesterol    High cholesterol is a condition in which the blood has high levels of a white, waxy substance similar to fat (cholesterol). The liver makes all the cholesterol that the body needs. The human body needs small amounts of cholesterol to help build cells. A person gets extra or excess cholesterol from the food that he or she eats.  The blood carries cholesterol from the liver to the rest of the body. If you have high cholesterol, deposits (plaques) may build up on the walls of your arteries. Arteries are the blood vessels that carry blood away from your heart. These plaques make the arteries narrow and stiff.  Cholesterol plaques increase your risk for heart attack and stroke. Work with your health care provider to keep your cholesterol levels in a healthy range.  What increases the risk?  The following factors may make you more likely to develop this condition:  · Eating foods that are high in animal fat (saturated fat) or cholesterol.  · Being overweight.  · Not getting enough exercise.  · A family history of high cholesterol (familial hypercholesterolemia).  · Use of tobacco products.  · Having diabetes.  What are the signs or symptoms?  There are no symptoms of this condition.  How is this diagnosed?  This condition may be diagnosed based on the results of a blood test.  · If you are older than 20 years of age, your health care provider may check your cholesterol levels every 4-6 years.  · You may be checked more often if you have high cholesterol or other risk factors for heart disease.  The blood test for cholesterol measures:  · \"Bad\" cholesterol, or LDL cholesterol. This is the main type of cholesterol that causes heart disease. The desired level is less than 100 mg/dL.  · \"Good\" cholesterol, or HDL cholesterol. HDL helps protect against heart disease by cleaning the arteries and carrying the LDL to the liver for processing. The desired level for HDL is 60 " mg/dL or higher.  · Triglycerides. These are fats that your body can store or burn for energy. The desired level is less than 150 mg/dL.  · Total cholesterol. This measures the total amount of cholesterol in your blood and includes LDL, HDL, and triglycerides. The desired level is less than 200 mg/dL.  How is this treated?  This condition may be treated with:  · Diet changes. You may be asked to eat foods that have more fiber and less saturated fats or added sugar.  · Lifestyle changes. These may include regular exercise, maintaining a healthy weight, and quitting use of tobacco products.  · Medicines. These are given when diet and lifestyle changes have not worked. You may be prescribed a statin medicine to help lower your cholesterol levels.  Follow these instructions at home:  Eating and drinking    · Eat a healthy, balanced diet. This diet includes:  ? Daily servings of a variety of fresh, frozen, or canned fruits and vegetables.  ? Daily servings of whole grain foods that are rich in fiber.  ? Foods that are low in saturated fats and trans fats. These include poultry and fish without skin, lean cuts of meat, and low-fat dairy products.  ? A variety of fish, especially oily fish that contain omega-3 fatty acids. Aim to eat fish at least 2 times a week.  · Avoid foods and drinks that have added sugar.  · Use healthy cooking methods, such as roasting, grilling, broiling, baking, poaching, steaming, and stir-frying. Do not nichole your food except for stir-frying.    Lifestyle    · Get regular exercise. Aim to exercise for a total of 150 minutes a week. Increase your activity level by doing activities such as gardening, walking, and taking the stairs.  · Do not use any products that contain nicotine or tobacco, such as cigarettes, e-cigarettes, and chewing tobacco. If you need help quitting, ask your health care provider.    General instructions  · Take over-the-counter and prescription medicines only as told by your  "health care provider.  · Keep all follow-up visits as told by your health care provider. This is important.  Where to find more information  · American Heart Association: www.heart.org  · National Heart, Lung, and Blood [place]: www.nhlbi.nih.gov  Contact a health care provider if:  · You have trouble achieving or maintaining a healthy diet or weight.  · You are starting an exercise program.  · You are unable to stop smoking.  Get help right away if:  · You have chest pain.  · You have trouble breathing.  · You have any symptoms of a stroke. \"BE FAST\" is an easy way to remember the main warning signs of a stroke:  ? B - Balance. Signs are dizziness, sudden trouble walking, or loss of balance.  ? E - Eyes. Signs are trouble seeing or a sudden change in vision.  ? F - Face. Signs are sudden weakness or numbness of the face, or the face or eyelid drooping on one side.  ? A - Arms. Signs are weakness or numbness in an arm. This happens suddenly and usually on one side of the body.  ? S - Speech. Signs are sudden trouble speaking, slurred speech, or trouble understanding what people say.  ? T - Time. Time to call emergency services. Write down what time symptoms started.  · You have other signs of a stroke, such as:  ? A sudden, severe headache with no known cause.  ? Nausea or vomiting.  ? Seizure.  These symptoms may represent a serious problem that is an emergency. Do not wait to see if the symptoms will go away. Get medical help right away. Call your local emergency services (911 in the [place] Do not drive yourself to the hospital.  Summary  · Cholesterol plaques increase your risk for heart attack and stroke. Work with your health care provider to keep your cholesterol levels in a healthy range.  · Eat a healthy, balanced diet, get regular exercise, and maintain a healthy weight.  · Do not use any products that contain nicotine or tobacco, such as cigarettes, e-cigarettes, and chewing tobacco.  · Get help right " away if you have any symptoms of a stroke.  This information is not intended to replace advice given to you by your health care provider. Make sure you discuss any questions you have with your health care provider.  Document Revised: 11/16/2020 Document Reviewed: 11/16/2020  [employer] Patient Education © 2021 [employer]  Preventing Type 2 Diabetes Mellitus  Type 2 diabetes, also called type 2 diabetes mellitus, is a long-term (chronic) disease that affects sugar (glucose) levels in your blood. Normally, a hormone called insulin allows glucose to enter cells in your body. The cells use glucose for energy. With type 2 diabetes, you will have one or both of these problems:  · Your pancreas does not make enough insulin.  · Cells in your body do not respond properly to insulin that your body makes (insulin resistance).  Insulin resistance or lack of insulin causes extra glucose to build up in the blood instead of going into cells. As a result, high blood glucose (hyperglycemia) develops. That can cause many complications. Being overweight or obese and having an inactive (sedentary) lifestyle can increase your risk for diabetes. Type 2 diabetes can be delayed or prevented by making certain nutrition and lifestyle changes.  How can this condition affect me?  If you do not take steps to prevent diabetes, your blood glucose levels may keep increasing over time. Too much glucose in your blood for a long time can damage your blood vessels, heart, kidneys, nerves, and eyes.  Type 2 diabetes can lead to chronic health problems and complications, such as:  · Heart disease.  · Stroke.  · Blindness.  · Kidney disease.  · Depression.  · Poor circulation in your feet and legs. In severe cases, a foot or leg may need to be surgically removed (amputated).  What can increase my risk?  You may be more likely to develop type 2 diabetes if you:  · Have type 2 diabetes in your family.  · Are overweight or obese.  · Have a sedentary  lifestyle.  · Have insulin resistance or a history of prediabetes.  · Have a history of pregnancy-related (gestational) diabetes or polycystic ovary syndrome (PCOS).  What actions can I take to prevent this?  It can be difficult to recognize signs of type 2 diabetes. Taking action to prevent the disease before you develop symptoms is the best way to avoid possible damage to your body. Making certain nutrition and lifestyle changes may prevent or delay the disease and related health problems.  Nutrition    · Eat healthy meals and snacks regularly. Do not skip meals. Fruit or a handful of nuts is a healthy snack between meals.  · Drink water throughout the day. Avoid drinks that contain added sugar, such as soda or sweetened tea. Drink enough fluid to keep your urine pale yellow.  · Follow instructions from your health care provider about eating or drinking restrictions.  · Limit the amount of food you eat by:  ? Controlling how much you eat at a time (portion size).  ? Checking food labels for the serving sizes of food.  ? Using a kitchen scale to weigh amounts of food.  · Sauté or steam food instead of frying it. Cook with water or broth instead of oils or butter.  · Limit saturated fat and salt (sodium) in your diet. Have no more than 1 tsp (2,400 mg) of sodium a day. If you have heart disease or high blood pressure, use less than ½?¾ tsp (1,500 mg) of sodium a day.    Lifestyle    · Lose weight if needed and as told. Your health care provider can determine how much weight loss is best for you and can help you lose weight safely.  · If you are overweight or obese, you may be told to lose at least 5?7% of your body weight.  · Manage blood pressure, cholesterol, and stress. Your health care provider will help determine the best treatment for you.  · Do not use any products that contain nicotine or tobacco, such as cigarettes, e-cigarettes, and chewing tobacco. If you need help quitting, ask your health care  provider.    Activity    · Do physical activity that makes your heart beat faster and makes you sweat (moderate intensity). Do this for at least 30 minutes on at least 5 days of the week, or as much as told by your health care provider.  · Ask your health care provider what activities are safe for you. A mix of activities may be best, such as walking, swimming, cycling, and strength training.  · Try to add physical activity into your day. For example:  ? Park your car farther away than usual so that you walk more.  ? Take a walk during your lunch break.  ? Use stairs instead of elevators or escalators.  ? Walk or bike to work instead of driving.    Alcohol use  If you drink alcohol:  · Limit how much you use to:  ? 0?1 drink a day for women who are not pregnant.  ? 0?2 drinks a day for men.  · Be aware of how much alcohol is in your drink. In the [place] one drink equals one 12 oz bottle of beer (355 mL), one 5 oz glass of wine (148 mL), or one 1½ oz glass of hard liquor (44 mL).  General information  · Talk with your health care provider about your risk factors and how you can reduce your risk for diabetes.  · Have your blood glucose tested regularly, as told by your health care provider.  · Get screening tests as told by your health care provider. You may have these regularly, especially if you have certain risk factors for type 2 diabetes.  · Make an appointment with a registered dietitian. This diet and nutrition specialist can help you make a healthy eating plan and help you understand portion sizes and food labels.  Where to find support  · Ask your health care provider to recommend a registered dietitian, a certified diabetes care and , or a weight loss program.  · Look for local or online weight loss groups.  · Join a gym, fitness club, or outdoor activity group, such as a walking club.  Where to find more information  To learn more about diabetes and diabetes prevention, visit:  · American  Diabetes Association (ADA): www.diabetes.org  · National [employer] of Diabetes and Digestive and Kidney Diseases: www.niddk.nih.gov  To learn more about healthy eating, visit:  · [place] Department of Agriculture ([employer]): www.[place].gov  · Office of Disease Prevention and Health Promotion (ODPHP): health.gov  Summary  · You can delay or prevent type 2 diabetes by eating healthy foods, losing weight if needed, and increasing your physical activity.  · Talk with your health care provider about your risk factors for type 2 diabetes and how you can reduce your risk.  · It can be difficult to recognize the signs of type 2 diabetes. The best way to avoid possible damage to your body is to take action to prevent the disease before you develop symptoms.  · Get screening tests as told by your health care provider.  This information is not intended to replace advice given to you by your health care provider. Make sure you discuss any questions you have with your health care provider.  Document Revised: 07/14/2021 Document Reviewed: 07/14/2021  [employer] Patient Education © 2021 [employer]